# Patient Record
Sex: FEMALE | Race: BLACK OR AFRICAN AMERICAN | NOT HISPANIC OR LATINO
[De-identification: names, ages, dates, MRNs, and addresses within clinical notes are randomized per-mention and may not be internally consistent; named-entity substitution may affect disease eponyms.]

---

## 2017-06-28 ENCOUNTER — TRANSCRIPTION ENCOUNTER (OUTPATIENT)
Age: 32
End: 2017-06-28

## 2017-07-01 ENCOUNTER — EMERGENCY (EMERGENCY)
Facility: HOSPITAL | Age: 32
LOS: 0 days | Discharge: HOME | End: 2017-07-01
Attending: EMERGENCY MEDICINE
Payer: COMMERCIAL

## 2017-07-01 VITALS
RESPIRATION RATE: 19 BRPM | DIASTOLIC BLOOD PRESSURE: 78 MMHG | OXYGEN SATURATION: 94 % | SYSTOLIC BLOOD PRESSURE: 139 MMHG | HEART RATE: 90 BPM | TEMPERATURE: 99 F

## 2017-07-01 VITALS
SYSTOLIC BLOOD PRESSURE: 129 MMHG | OXYGEN SATURATION: 99 % | DIASTOLIC BLOOD PRESSURE: 60 MMHG | TEMPERATURE: 99 F | HEART RATE: 87 BPM | RESPIRATION RATE: 14 BRPM

## 2017-07-01 DIAGNOSIS — R07.9 CHEST PAIN, UNSPECIFIED: ICD-10-CM

## 2017-07-01 DIAGNOSIS — M35.00 SJOGREN SYNDROME, UNSPECIFIED: ICD-10-CM

## 2017-07-01 DIAGNOSIS — I34.1 NONRHEUMATIC MITRAL (VALVE) PROLAPSE: ICD-10-CM

## 2017-07-01 LAB
ALBUMIN SERPL ELPH-MCNC: 3.5 G/DL — SIGNIFICANT CHANGE UP (ref 3.3–5)
ALP SERPL-CCNC: 58 U/L — SIGNIFICANT CHANGE UP (ref 40–120)
ALT FLD-CCNC: 23 U/L — SIGNIFICANT CHANGE UP (ref 12–78)
ANION GAP SERPL CALC-SCNC: 10 MMOL/L — SIGNIFICANT CHANGE UP (ref 5–17)
APTT BLD: 30.6 SEC — SIGNIFICANT CHANGE UP (ref 27.5–37.4)
AST SERPL-CCNC: 8 U/L — LOW (ref 15–37)
BASOPHILS # BLD AUTO: 0.1 K/UL — SIGNIFICANT CHANGE UP (ref 0–0.2)
BASOPHILS NFR BLD AUTO: 1 % — SIGNIFICANT CHANGE UP (ref 0–2)
BILIRUB SERPL-MCNC: 0.4 MG/DL — SIGNIFICANT CHANGE UP (ref 0.2–1.2)
BUN SERPL-MCNC: 14 MG/DL — SIGNIFICANT CHANGE UP (ref 7–23)
CALCIUM SERPL-MCNC: 8.2 MG/DL — LOW (ref 8.5–10.1)
CHLORIDE SERPL-SCNC: 107 MMOL/L — SIGNIFICANT CHANGE UP (ref 96–108)
CK MB BLD-MCNC: <0.4 % — SIGNIFICANT CHANGE UP (ref 0–3.5)
CK MB CFR SERPL CALC: <0.5 NG/ML — SIGNIFICANT CHANGE UP (ref 0.5–3.6)
CK SERPL-CCNC: 116 U/L — SIGNIFICANT CHANGE UP (ref 26–192)
CO2 SERPL-SCNC: 26 MMOL/L — SIGNIFICANT CHANGE UP (ref 22–31)
CREAT SERPL-MCNC: 0.78 MG/DL — SIGNIFICANT CHANGE UP (ref 0.5–1.3)
D DIMER BLD IA.RAPID-MCNC: 303 NG/ML DDU — HIGH
EOSINOPHIL # BLD AUTO: 0.1 K/UL — SIGNIFICANT CHANGE UP (ref 0–0.5)
EOSINOPHIL NFR BLD AUTO: 1.4 % — SIGNIFICANT CHANGE UP (ref 0–6)
GLUCOSE SERPL-MCNC: 77 MG/DL — SIGNIFICANT CHANGE UP (ref 70–99)
HCG SERPL-ACNC: <1 MIU/ML — SIGNIFICANT CHANGE UP
HCT VFR BLD CALC: 37.5 % — SIGNIFICANT CHANGE UP (ref 34.5–45)
HGB BLD-MCNC: 13.8 G/DL — SIGNIFICANT CHANGE UP (ref 11.5–15.5)
INR BLD: 1.06 RATIO — SIGNIFICANT CHANGE UP (ref 0.88–1.16)
LYMPHOCYTES # BLD AUTO: 1.9 K/UL — SIGNIFICANT CHANGE UP (ref 1–3.3)
LYMPHOCYTES # BLD AUTO: 27.9 % — SIGNIFICANT CHANGE UP (ref 13–44)
MCHC RBC-ENTMCNC: 31.6 PG — SIGNIFICANT CHANGE UP (ref 27–34)
MCHC RBC-ENTMCNC: 36.7 GM/DL — HIGH (ref 32–36)
MCV RBC AUTO: 86 FL — SIGNIFICANT CHANGE UP (ref 80–100)
MONOCYTES # BLD AUTO: 0.3 K/UL — SIGNIFICANT CHANGE UP (ref 0–0.9)
MONOCYTES NFR BLD AUTO: 4.7 % — SIGNIFICANT CHANGE UP (ref 2–14)
NEUTROPHILS # BLD AUTO: 4.3 K/UL — SIGNIFICANT CHANGE UP (ref 1.8–7.4)
NEUTROPHILS NFR BLD AUTO: 65 % — SIGNIFICANT CHANGE UP (ref 43–77)
NT-PROBNP SERPL-SCNC: 94 PG/ML — SIGNIFICANT CHANGE UP (ref 0–125)
PLATELET # BLD AUTO: 236 K/UL — SIGNIFICANT CHANGE UP (ref 150–400)
POTASSIUM SERPL-MCNC: 3.6 MMOL/L — SIGNIFICANT CHANGE UP (ref 3.5–5.3)
POTASSIUM SERPL-SCNC: 3.6 MMOL/L — SIGNIFICANT CHANGE UP (ref 3.5–5.3)
PROT SERPL-MCNC: 7.1 GM/DL — SIGNIFICANT CHANGE UP (ref 6–8.3)
PROTHROM AB SERPL-ACNC: 11.6 SEC — SIGNIFICANT CHANGE UP (ref 9.8–12.7)
RBC # BLD: 4.36 M/UL — SIGNIFICANT CHANGE UP (ref 3.8–5.2)
RBC # FLD: 10.8 % — LOW (ref 11–15)
SODIUM SERPL-SCNC: 143 MMOL/L — SIGNIFICANT CHANGE UP (ref 135–145)
TROPONIN I SERPL-MCNC: <.015 NG/ML — SIGNIFICANT CHANGE UP (ref 0.01–0.04)
WBC # BLD: 6.6 K/UL — SIGNIFICANT CHANGE UP (ref 3.8–10.5)
WBC # FLD AUTO: 6.6 K/UL — SIGNIFICANT CHANGE UP (ref 3.8–10.5)

## 2017-07-01 PROCEDURE — 93970 EXTREMITY STUDY: CPT | Mod: 26

## 2017-07-01 PROCEDURE — 71020: CPT | Mod: 26

## 2017-07-01 PROCEDURE — 71275 CT ANGIOGRAPHY CHEST: CPT | Mod: 26

## 2017-07-01 PROCEDURE — 99285 EMERGENCY DEPT VISIT HI MDM: CPT

## 2017-07-01 RX ORDER — HYDROXYCHLOROQUINE SULFATE 200 MG
1 TABLET ORAL
Qty: 0 | Refills: 0 | COMMUNITY

## 2017-07-01 RX ORDER — NORETHINDRONE AND ETHINYL ESTRADIOL 0.4-0.035
0 KIT ORAL
Qty: 0 | Refills: 0 | COMMUNITY

## 2017-07-01 RX ORDER — KETOROLAC TROMETHAMINE 30 MG/ML
30 SYRINGE (ML) INJECTION ONCE
Qty: 0 | Refills: 0 | Status: DISCONTINUED | OUTPATIENT
Start: 2017-07-01 | End: 2017-07-01

## 2017-07-01 RX ADMIN — Medication 30 MILLIGRAM(S): at 19:55

## 2017-07-01 RX ADMIN — Medication 30 MILLIGRAM(S): at 19:44

## 2017-07-01 NOTE — ED PROVIDER NOTE - MEDICAL DECISION MAKING DETAILS
patient presnt with pleuritic chest pain on OCP with background of sjogrens on plaquinil.  mild elevation of d-dimer.  US and CT pending.  Lab values do not require emergent intervention. Patient signed out to incoming physician.  All decisions regarding the progression of care will be made at their discretion. patient presnt with pleuritic chest pain on OCP with background of sjogrens on plaquinil.  mild elevation of d-dimer.  Ultrasound demonstrates no acute pathology. CT scan demonstrates no acute pathology. .  Lab values do not require emergent intervention. Pending repeat labs.  Patient signed out to incoming physician.  All decisions regarding the progression of care will be made at their discretion. patient present with pleuritic chest pain on OCP with background of sjogrens on plaquinil.  mild elevation of d-dimer.  Ultrasound demonstrates no acute pathology. CT scan demonstrates no acute pathology. .  Lab values do not require emergent intervention. discussed r/b/a/ of repeat enzymes.  agree and will support patient in not repeating enzymes.  Patient feeling better.  smiling bedside.  will f/u.  Discussed results and outcome of testing with the patient.  Patient advised to please follow up with their primary care doctor within the next 24 hours and return to the Emergency Department for worsening symptoms or any other concerns.  Patient advised that their doctor may call  to follow up on the specific results of the tests performed today in the emergency department.

## 2017-07-01 NOTE — ED PROVIDER NOTE - OBJECTIVE STATEMENT
Pertinent PMH/PSH/FHx/SHx and Review of Systems contained within:  Patient presents to the ED for left pleurtic chest pain.  PMH of sjogrens and MVP. takes OCP.  Patient with Sx starting 1 week ago.  now with acute worsening today.  also some leg "aching" for 2 days now resolved.  no recent travel.  otherwise baselin.  no other complaints.  As interpreted by ED physician, ECG is NSR with normal intervals/axis, no changes in QRS, no ST/T changes. Non toxic.  Well appearing. No other pertinent PMH.  No other pertinent PSH.  No other pertinent FHx.  Patient denies EtOH/tobacco/illicit substance use. No fever/chills, No photophobia/eye pain/changes in vision, No ear pain/sore throat/dysphagia, No palpitations, no cough/wheeze/stridor, No abdominal pain, No N/V/D, no dysuria/frequency/discharge, No neck/back pain, no rash, no changes in neurological status/function.

## 2017-07-01 NOTE — ED PROVIDER NOTE - PHYSICAL EXAMINATION
Gen: Alert, NAD Head: NC, AT, PERRL, EOMI, normal lids/conjunctiva ENT: normal hearing, patent oropharynx without erythema/exudate, uvula midline Neck: +supple, no tenderness/meningismus/JVD, +Trachea midline Pulm: Bilateral BS, normal resp effort, no wheeze/stridor/retractions CV: RRR, no M/R/G, +dist pulses Abd: soft, NT/ND, +BS, no hepatosplenomegaly Mskel: no edema/erythema/cyanosis Skin: no rash Neuro: AAOx3, no sensory/motor deficits

## 2017-07-01 NOTE — ED ADULT NURSE NOTE - OBJECTIVE STATEMENT
.33 yo female c/o cp x 1 week, worse x 1 hour ago, L side radiating to back, c/o sob. pt reports birth control Minestrin x 3 years denies smoking.  pt drives all day long for work.

## 2017-09-26 ENCOUNTER — APPOINTMENT (OUTPATIENT)
Dept: CARDIOLOGY | Facility: CLINIC | Age: 32
End: 2017-09-26

## 2017-11-06 ENCOUNTER — RESULT REVIEW (OUTPATIENT)
Age: 32
End: 2017-11-06

## 2017-12-10 ENCOUNTER — EMERGENCY (EMERGENCY)
Facility: HOSPITAL | Age: 32
LOS: 1 days | Discharge: HOME | End: 2017-12-10
Admitting: EMERGENCY MEDICINE
Payer: COMMERCIAL

## 2017-12-10 VITALS
SYSTOLIC BLOOD PRESSURE: 119 MMHG | HEART RATE: 93 BPM | TEMPERATURE: 98 F | RESPIRATION RATE: 16 BRPM | OXYGEN SATURATION: 100 % | DIASTOLIC BLOOD PRESSURE: 73 MMHG

## 2017-12-10 DIAGNOSIS — M25.571 PAIN IN RIGHT ANKLE AND JOINTS OF RIGHT FOOT: ICD-10-CM

## 2017-12-10 DIAGNOSIS — Z91.018 ALLERGY TO OTHER FOODS: ICD-10-CM
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
## 2017-12-10 DIAGNOSIS — S93.601A UNSPECIFIED SPRAIN OF RIGHT FOOT, INITIAL ENCOUNTER: ICD-10-CM

## 2017-12-10 DIAGNOSIS — Z79.899 OTHER LONG TERM (CURRENT) DRUG THERAPY: ICD-10-CM

## 2017-12-10 PROCEDURE — 73630 X-RAY EXAM OF FOOT: CPT | Mod: 26,RT

## 2017-12-10 PROCEDURE — 73610 X-RAY EXAM OF ANKLE: CPT | Mod: 26,RT

## 2017-12-10 PROCEDURE — 99284 EMERGENCY DEPT VISIT MOD MDM: CPT | Mod: 25

## 2017-12-10 RX ORDER — TRAMADOL HYDROCHLORIDE 50 MG/1
1 TABLET ORAL
Qty: 12 | Refills: 0 | OUTPATIENT
Start: 2017-12-10

## 2017-12-10 RX ORDER — OXYCODONE AND ACETAMINOPHEN 5; 325 MG/1; MG/1
1 TABLET ORAL ONCE
Qty: 0 | Refills: 0 | Status: DISCONTINUED | OUTPATIENT
Start: 2017-12-10 | End: 2017-12-10

## 2017-12-10 RX ORDER — IBUPROFEN 200 MG
1 TABLET ORAL
Qty: 20 | Refills: 0 | OUTPATIENT
Start: 2017-12-10

## 2017-12-10 RX ADMIN — OXYCODONE AND ACETAMINOPHEN 1 TABLET(S): 5; 325 TABLET ORAL at 21:05

## 2017-12-10 RX ADMIN — OXYCODONE AND ACETAMINOPHEN 1 TABLET(S): 5; 325 TABLET ORAL at 20:32

## 2017-12-10 NOTE — ED PROVIDER NOTE - MEDICAL DECISION MAKING DETAILS
Ar compression dressing applied by me, encouraged RICE to affected region, weight bear as tolerated, f/u ortho, pt verbalized understanding. xray neg for acute fx, CAM boot and crutches provided, encouraged RICE to affected region, weight bear as tolerated, f/u ortho, pt verbalized understanding.

## 2017-12-10 NOTE — ED PROVIDER NOTE - PHYSICAL EXAMINATION
Gen - WDWN, NAD, comfortable in stretcher,  non-toxic appearing  Skin - warm, dry, intact   CV - S1S2, R/R/R  Resp - CTAB, no r/r/w   MS - w/w/p, no midline tenderness, neck supple and NT, R foot with +edema and TTP over 5th MTP region, no erythema, ecchymosis, crepitus, joint laxity, or deformity, restricted ROM 2/2 pain, NV intact.   Neuro - AxOx3, ambulatory with mild limp

## 2017-12-10 NOTE — ED PROVIDER NOTE - OBJECTIVE STATEMENT
33 yo F with PMHx of sjogren' s syndrome, presenting c/o R ankle and foot pain s/p fall.  Pt slipped and fell today and landed on R foot.  Now with pain and difficulty with weight bearing.  Denies head trauma, LOC, break in the skin, paresthesia, numbness, tingling, redness, bleeding, d/c, HA, dizziness, SOB, CP, palpitations, N/V, focal weakness, and malaise. Pt is unable to weight bear on the R s/p fall 33 yo F with PMHx of sjogren' s syndrome and MVP, presenting c/o R ankle and foot pain s/p fall.  Pt slipped and fell today and landed on R foot.  Now with pain and difficulty with weight bearing.  Denies head trauma, LOC, break in the skin, paresthesia, numbness, tingling, redness, bleeding, d/c, HA, dizziness, SOB, CP, palpitations, N/V, focal weakness, and malaise. Pt is unable to weight bear on the R s/p fall

## 2018-02-05 ENCOUNTER — TRANSCRIPTION ENCOUNTER (OUTPATIENT)
Age: 33
End: 2018-02-05

## 2018-12-17 ENCOUNTER — RESULT REVIEW (OUTPATIENT)
Age: 33
End: 2018-12-17

## 2019-05-22 ENCOUNTER — TRANSCRIPTION ENCOUNTER (OUTPATIENT)
Age: 34
End: 2019-05-22

## 2019-06-28 NOTE — ED PROVIDER NOTE - DIAGNOSTIC INTERPRETATION
Oriented - self; Oriented - place; Oriented - time
Xray (wet reads) interpreted by JONN ARRIETA   Xray foot/ankle - +soft tissue swelling. no acute fx or dislocation, joint space intact, no effusion noted

## 2019-12-23 ENCOUNTER — RESULT REVIEW (OUTPATIENT)
Age: 34
End: 2019-12-23

## 2021-02-22 ENCOUNTER — RESULT REVIEW (OUTPATIENT)
Age: 36
End: 2021-02-22

## 2022-04-28 PROBLEM — I34.1 NONRHEUMATIC MITRAL (VALVE) PROLAPSE: Chronic | Status: ACTIVE | Noted: 2017-07-01

## 2022-04-28 PROBLEM — M35.00 SJOGREN SYNDROME, UNSPECIFIED: Chronic | Status: ACTIVE | Noted: 2017-12-10

## 2022-05-31 ENCOUNTER — RESULT REVIEW (OUTPATIENT)
Age: 37
End: 2022-05-31

## 2022-05-31 ENCOUNTER — APPOINTMENT (OUTPATIENT)
Dept: OBGYN | Facility: CLINIC | Age: 37
End: 2022-05-31
Payer: COMMERCIAL

## 2022-05-31 PROCEDURE — 76830 TRANSVAGINAL US NON-OB: CPT

## 2022-05-31 PROCEDURE — 81002 URINALYSIS NONAUTO W/O SCOPE: CPT

## 2022-05-31 PROCEDURE — 99395 PREV VISIT EST AGE 18-39: CPT | Mod: 25

## 2022-08-16 ENCOUNTER — APPOINTMENT (OUTPATIENT)
Dept: ANESTHESIOLOGY | Facility: CLINIC | Age: 37
End: 2022-08-16

## 2022-08-16 ENCOUNTER — OUTPATIENT (OUTPATIENT)
Dept: OUTPATIENT SERVICES | Facility: HOSPITAL | Age: 37
LOS: 1 days | End: 2022-08-16
Payer: COMMERCIAL

## 2022-08-16 DIAGNOSIS — G58.0 INTERCOSTAL NEUROPATHY: ICD-10-CM

## 2022-08-16 PROCEDURE — 64421 NJX AA&/STRD NTRCOST NRV EA: CPT

## 2022-09-12 ENCOUNTER — APPOINTMENT (OUTPATIENT)
Dept: MRI IMAGING | Facility: CLINIC | Age: 37
End: 2022-09-12

## 2022-12-27 ENCOUNTER — OUTPATIENT (OUTPATIENT)
Dept: OUTPATIENT SERVICES | Facility: HOSPITAL | Age: 37
LOS: 1 days | End: 2022-12-27
Payer: COMMERCIAL

## 2022-12-27 ENCOUNTER — APPOINTMENT (OUTPATIENT)
Dept: ANESTHESIOLOGY | Facility: CLINIC | Age: 37
End: 2022-12-27

## 2022-12-27 DIAGNOSIS — G80.0 SPASTIC QUADRIPLEGIC CEREBRAL PALSY: ICD-10-CM

## 2022-12-27 DIAGNOSIS — G58.0 INTERCOSTAL NEUROPATHY: ICD-10-CM

## 2022-12-27 PROCEDURE — 64420 NJX AA&/STRD NTRCOST NRV 1: CPT

## 2023-04-28 ENCOUNTER — APPOINTMENT (OUTPATIENT)
Dept: MRI IMAGING | Facility: CLINIC | Age: 38
End: 2023-04-28

## 2023-05-04 ENCOUNTER — APPOINTMENT (OUTPATIENT)
Dept: MRI IMAGING | Facility: CLINIC | Age: 38
End: 2023-05-04

## 2023-05-16 ENCOUNTER — NON-APPOINTMENT (OUTPATIENT)
Age: 38
End: 2023-05-16

## 2023-05-16 ENCOUNTER — APPOINTMENT (OUTPATIENT)
Dept: OPHTHALMOLOGY | Facility: CLINIC | Age: 38
End: 2023-05-16
Payer: COMMERCIAL

## 2023-05-16 PROCEDURE — 92134 CPTRZ OPH DX IMG PST SGM RTA: CPT

## 2023-05-16 PROCEDURE — 92004 COMPRE OPH EXAM NEW PT 1/>: CPT

## 2023-06-19 ENCOUNTER — APPOINTMENT (OUTPATIENT)
Dept: OBGYN | Facility: CLINIC | Age: 38
End: 2023-06-19
Payer: COMMERCIAL

## 2023-06-19 PROCEDURE — 76830 TRANSVAGINAL US NON-OB: CPT

## 2023-06-19 PROCEDURE — 81002 URINALYSIS NONAUTO W/O SCOPE: CPT

## 2023-06-19 PROCEDURE — 99395 PREV VISIT EST AGE 18-39: CPT

## 2023-07-06 ENCOUNTER — APPOINTMENT (OUTPATIENT)
Dept: MRI IMAGING | Facility: CLINIC | Age: 38
End: 2023-07-06

## 2023-09-01 ENCOUNTER — NON-APPOINTMENT (OUTPATIENT)
Age: 38
End: 2023-09-01

## 2023-10-20 ENCOUNTER — NON-APPOINTMENT (OUTPATIENT)
Age: 38
End: 2023-10-20

## 2024-01-29 ENCOUNTER — APPOINTMENT (OUTPATIENT)
Dept: OBGYN | Facility: CLINIC | Age: 39
End: 2024-01-29
Payer: COMMERCIAL

## 2024-01-29 PROCEDURE — 76830 TRANSVAGINAL US NON-OB: CPT

## 2024-01-29 PROCEDURE — 99213 OFFICE O/P EST LOW 20 MIN: CPT

## 2024-02-18 ENCOUNTER — NON-APPOINTMENT (OUTPATIENT)
Age: 39
End: 2024-02-18

## 2024-02-21 ENCOUNTER — NON-APPOINTMENT (OUTPATIENT)
Age: 39
End: 2024-02-21

## 2024-06-04 ENCOUNTER — APPOINTMENT (OUTPATIENT)
Dept: DERMATOLOGY | Facility: CLINIC | Age: 39
End: 2024-06-04

## 2024-06-11 ENCOUNTER — APPOINTMENT (OUTPATIENT)
Dept: ORTHOPEDIC SURGERY | Facility: CLINIC | Age: 39
End: 2024-06-11
Payer: COMMERCIAL

## 2024-06-11 VITALS — HEIGHT: 70 IN | BODY MASS INDEX: 27.92 KG/M2 | WEIGHT: 195 LBS

## 2024-06-11 DIAGNOSIS — M76.72 PERONEAL TENDINITIS, LEFT LEG: ICD-10-CM

## 2024-06-11 DIAGNOSIS — M76.822 POSTERIOR TIBIAL TENDINITIS, LEFT LEG: ICD-10-CM

## 2024-06-11 PROCEDURE — 73600 X-RAY EXAM OF ANKLE: CPT | Mod: LT

## 2024-06-11 PROCEDURE — 99203 OFFICE O/P NEW LOW 30 MIN: CPT

## 2024-06-11 PROCEDURE — L4361: CPT | Mod: LT

## 2024-06-11 PROCEDURE — 73630 X-RAY EXAM OF FOOT: CPT | Mod: LT

## 2024-06-11 RX ORDER — METHYLPREDNISOLONE 4 MG/1
4 TABLET ORAL
Qty: 1 | Refills: 0 | Status: ACTIVE | COMMUNITY
Start: 2024-06-11 | End: 1900-01-01

## 2024-06-11 NOTE — PHYSICAL EXAM
[Moderate] : moderate diffused ankle swelling [NL (40)] : plantar flexion 40 degrees [NL 30)] : inversion 30 degrees [NL (20)] : eversion 20 degrees [5___] : eversion 5[unfilled]/5 [2+] : posterior tibialis pulse: 2+ [Left] : left foot [There are no fractures, subluxations or dislocations. No significant abnormalities are seen] : There are no fractures, subluxations or dislocations. No significant abnormalities are seen [] : not able to perform single heel raise

## 2024-06-11 NOTE — ASSESSMENT
[FreeTextEntry1] : Xrays reviewed with patient Treatment options disussed ASO brace applied today -  medically necessary for stability / protected weight bearing MDP sent for pain and inflammation - patient on daily indomethacin for sjorgen's  Recommend course of PT/HEP Supportive sneaker / activity modification  Discussed ice and elevation Follow up in 3 weeks

## 2024-06-11 NOTE — HISTORY OF PRESENT ILLNESS
[7] : 7 [0] : 0 [Dull/Aching] : dull/aching [Localized] : localized [Intermittent] : intermittent [Standing] : standing [Walking] : walking [Stairs] : stairs [Full time] : Work status: full time [de-identified] : 06/11/2024: Patient is a 40 yo female c/o left foot/ankle pain for 1 week, worsening 3 days ago, with no specific injury. She has been increasing her activity / working out. No n/t. Taking advil as needed. No previous injuries or surgeries to left foot/ankle.  [] : Post Surgical Visit: no [FreeTextEntry1] : left foot [FreeTextEntry5] : Patient woke up on 6/8/24 with pain in her left foot/ankle, no specific injury, pt states she has been doing a lot of walking. no prior hx

## 2024-07-03 ENCOUNTER — APPOINTMENT (OUTPATIENT)
Dept: ORTHOPEDIC SURGERY | Facility: CLINIC | Age: 39
End: 2024-07-03

## 2024-07-22 ENCOUNTER — APPOINTMENT (OUTPATIENT)
Dept: OBGYN | Facility: CLINIC | Age: 39
End: 2024-07-22

## 2024-07-22 PROCEDURE — 99459 PELVIC EXAMINATION: CPT

## 2024-07-22 PROCEDURE — 76830 TRANSVAGINAL US NON-OB: CPT

## 2024-07-22 PROCEDURE — 99396 PREV VISIT EST AGE 40-64: CPT | Mod: 25

## 2024-07-22 PROCEDURE — 81002 URINALYSIS NONAUTO W/O SCOPE: CPT

## 2024-08-20 ENCOUNTER — APPOINTMENT (OUTPATIENT)
Dept: OPHTHALMOLOGY | Facility: CLINIC | Age: 39
End: 2024-08-20
Payer: COMMERCIAL

## 2024-08-20 ENCOUNTER — NON-APPOINTMENT (OUTPATIENT)
Age: 39
End: 2024-08-20

## 2024-08-20 PROCEDURE — 92134 CPTRZ OPH DX IMG PST SGM RTA: CPT

## 2024-08-20 PROCEDURE — 92083 EXTENDED VISUAL FIELD XM: CPT

## 2024-08-20 PROCEDURE — 92004 COMPRE OPH EXAM NEW PT 1/>: CPT

## 2024-09-17 ENCOUNTER — APPOINTMENT (OUTPATIENT)
Dept: DERMATOLOGY | Facility: CLINIC | Age: 39
End: 2024-09-17
Payer: COMMERCIAL

## 2024-09-17 DIAGNOSIS — L65.9 NONSCARRING HAIR LOSS, UNSPECIFIED: ICD-10-CM

## 2024-09-17 DIAGNOSIS — L65.8 OTHER SPECIFIED NONSCARRING HAIR LOSS: ICD-10-CM

## 2024-09-17 DIAGNOSIS — D23.9 OTHER BENIGN NEOPLASM OF SKIN, UNSPECIFIED: ICD-10-CM

## 2024-09-17 PROCEDURE — 99204 OFFICE O/P NEW MOD 45 MIN: CPT | Mod: 25

## 2024-09-17 PROCEDURE — 11900 INJECT SKIN LESIONS </W 7: CPT

## 2024-09-17 NOTE — ASSESSMENT
[FreeTextEntry1] : #Syringomas, multiple periocular  - Discussed no great topical medication options as these are growths - Discussed possible treatment with cautery for cosmetic treatment, would do test spot today if desired, discussed risk of PIH, pt declines  #Alopecia, favor long standing traction alopecia with some areas of scarring though patient feels she has not been wearing tight hairstyle over the last few years and it has still gotten worse  Chronic, progressive  - Photos taken for clinical monitoring 09/17/2024  - Discussed could consider bx, but would not definitely give a diagnosis  -Reviewed importance of wearing hair in loose fitting styles -Discussed treatment options including ILK and PO minoxidil vs topical to only these sites  -- would need to discuss PO minoxidil with cardiologist given MVP - defers today  - ILK Session #1 09/17/2024 -will plan for trial of 4 sessions  Procedure note: Intralesional injection of triamcinolone (ILTAC)  Site: b/l temples  2 lesions injected with intralesional triamcinolone  2.5 mg/cc  2 ccs injected total  Verbal informed consent obtained. Risks/benefits/alternatives discussed including but not limited to risk of bleeding, hypopigmentation, striae and atrophy as well as possible lack of treatment efficacy and need for repeat treatments. Site confirmed. Area prepped with alcohol. Discussed wound care instructions. Patient tolerated well with no immediate complications.  RTC 4-6 weeks

## 2024-09-17 NOTE — HISTORY OF PRESENT ILLNESS
[FreeTextEntry1] : NPV: hair loss [de-identified] : REY GILLIS is a 39 year old w/ Sjogren's on Plaquenil & MVP who is presenting for evaluation of:   1. Bumps around the eyes, thinks they were milia, had them lanced as child, has been developing more over the yrs  2. 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

## 2024-09-17 NOTE — PHYSICAL EXAM
[FreeTextEntry3] : Focused skin exam performed  The relevant portions of the exam were performed today  AAOx3, NAD, well-appearing / pleasant Focused examination within normal limits with the exception of:  - skin colored papules in the periocular area  - loss of follicles in the b/l temples  - miniaturization of hair follicles on the b/l temples  + fringe sign

## 2024-10-29 ENCOUNTER — APPOINTMENT (OUTPATIENT)
Dept: DERMATOLOGY | Facility: CLINIC | Age: 39
End: 2024-10-29

## 2024-12-12 ENCOUNTER — EMERGENCY (EMERGENCY)
Facility: HOSPITAL | Age: 39
LOS: 1 days | Discharge: HOME | End: 2024-12-12
Attending: EMERGENCY MEDICINE
Payer: COMMERCIAL

## 2024-12-12 VITALS
SYSTOLIC BLOOD PRESSURE: 100 MMHG | DIASTOLIC BLOOD PRESSURE: 78 MMHG | HEART RATE: 98 BPM | OXYGEN SATURATION: 98 % | RESPIRATION RATE: 20 BRPM

## 2024-12-12 VITALS
DIASTOLIC BLOOD PRESSURE: 80 MMHG | TEMPERATURE: 98 F | WEIGHT: 190.04 LBS | HEIGHT: 69 IN | RESPIRATION RATE: 20 BRPM | HEART RATE: 116 BPM | SYSTOLIC BLOOD PRESSURE: 133 MMHG | OXYGEN SATURATION: 98 %

## 2024-12-12 PROCEDURE — 96374 THER/PROPH/DIAG INJ IV PUSH: CPT

## 2024-12-12 PROCEDURE — 99284 EMERGENCY DEPT VISIT MOD MDM: CPT | Mod: 25

## 2024-12-12 PROCEDURE — 96375 TX/PRO/DX INJ NEW DRUG ADDON: CPT

## 2024-12-12 PROCEDURE — 99284 EMERGENCY DEPT VISIT MOD MDM: CPT

## 2024-12-12 RX ORDER — FAMOTIDINE 20 MG/1
20 TABLET, FILM COATED ORAL ONCE
Refills: 0 | Status: COMPLETED | OUTPATIENT
Start: 2024-12-12 | End: 2024-12-12

## 2024-12-12 RX ORDER — SODIUM CHLORIDE 9 MG/ML
1000 INJECTION, SOLUTION INTRAMUSCULAR; INTRAVENOUS; SUBCUTANEOUS ONCE
Refills: 0 | Status: COMPLETED | OUTPATIENT
Start: 2024-12-12 | End: 2024-12-12

## 2024-12-12 RX ORDER — DIPHENHYDRAMINE HCL 25 MG
25 CAPSULE ORAL ONCE
Refills: 0 | Status: COMPLETED | OUTPATIENT
Start: 2024-12-12 | End: 2024-12-12

## 2024-12-12 RX ORDER — METHYLPREDNISOLONE SOD SUCC 125 MG
125 VIAL (EA) INJECTION ONCE
Refills: 0 | Status: COMPLETED | OUTPATIENT
Start: 2024-12-12 | End: 2024-12-12

## 2024-12-12 RX ORDER — FAMOTIDINE 20 MG/1
20 TABLET, FILM COATED ORAL DAILY
Refills: 0 | Status: DISCONTINUED | OUTPATIENT
Start: 2024-12-12 | End: 2024-12-12

## 2024-12-12 RX ORDER — EPINEPHRINE 1 MG/ML(1)
0.3 AMPUL (ML) INJECTION
Qty: 1 | Refills: 0
Start: 2024-12-12 | End: 2024-12-12

## 2024-12-12 RX ORDER — PREDNISONE 20 MG/1
1 TABLET ORAL
Qty: 4 | Refills: 0
Start: 2024-12-12 | End: 2024-12-15

## 2024-12-12 RX ADMIN — Medication 125 MILLIGRAM(S): at 16:57

## 2024-12-12 RX ADMIN — SODIUM CHLORIDE 1000 MILLILITER(S): 9 INJECTION, SOLUTION INTRAMUSCULAR; INTRAVENOUS; SUBCUTANEOUS at 16:57

## 2024-12-12 RX ADMIN — Medication 25 MILLIGRAM(S): at 16:57

## 2024-12-12 RX ADMIN — FAMOTIDINE 20 MILLIGRAM(S): 20 TABLET, FILM COATED ORAL at 17:02

## 2024-12-12 NOTE — ED ADULT TRIAGE NOTE - CHIEF COMPLAINT QUOTE
BIOBA for  hives  and  redness  all over  the body  asscidetally  ate  a  sesame  seed  oil  from her  salad.  no signs of  any  shortness of  breath  . pt given  benadryl PO  on the  scene.

## 2024-12-12 NOTE — ED ADULT NURSE NOTE - NS ED NURSE DC INFO COMPLEXITY
Skin normal color for race, warm, dry and intact. No evidence of rash. Simple: Patient demonstrates quick and easy understanding/Returned Demonstration/Verbalized Understanding

## 2024-12-12 NOTE — ED PROVIDER NOTE - PROGRESS NOTE DETAILS
Lizzy Obrien DO Patient received as a signout.  Patient reports her symptoms are completely resolved at this time - had two systems involved.  Patient did get EpiPen.  Given the extension of her symptoms at this time during the episode patient is given instructions on how to use EpiPen if necessary.  Will send it to her pharmacy.  Strict return precautions are discussed all questions answered.

## 2024-12-12 NOTE — ED PROVIDER NOTE - CLINICAL SUMMARY MEDICAL DECISION MAKING FREE TEXT BOX
will treat for allergic reaction to sesame oil.pt counseled/epipen given as out pt.fu allergy testing

## 2024-12-12 NOTE — ED PROVIDER NOTE - OBJECTIVE STATEMENT
pt biba secondary to allergic reaction,post seasme oil ingestion 1 hr prior.no lip/tongue swelling/no wheeze.nauseated/vomiting

## 2024-12-12 NOTE — ED PROVIDER NOTE - NSFOLLOWUPINSTRUCTIONS_ED_ALL_ED_FT
Increase Avapro to 300mg daily  Decrease lasix to 20 mg daily   Anaphylactic Reaction, Adult  An anaphylactic reaction is a sudden and very bad allergic reaction. It must be treated right away.    What are the causes?  Being exposed to things you are allergic to (allergens). These may be:  Foods, like peanuts, wheat, shellfish, milk, or eggs.  Medicines.  Insect bites or stings.  Blood or parts of blood that have been given to you for treatment (transfusions).  Chemicals. These include latex and dyes used in food and medical tests.  What increases the risk?  Having allergies.  Having had this kind of reaction before.  Having a family history of this kind of reaction.  Having asthma or eczema.  What are the signs or symptoms?  Feeling warm in the face (flushed). Your face may turn red.  Hives. These are itchy, red, swollen areas of skin.  Swelling of the eyes, lips, face, mouth, tongue, or throat.  Trouble breathing or speaking.  Trouble swallowing.  Feeling dizzy or fainting.  Pain or cramps in your belly (abdomen).  Vomiting or watery poop (diarrhea).  How is this treated?  A person using an auto-injector pen in the thigh.  If you are having a bad allergic reaction, you should:  Give yourself a shot using a device filled with epinephrine (auto-injector pen). Your doctor will teach you how to use the pen.  Call for help. If you use an auto-injector pen, you must still get treated in the hospital. You may be given:  Medicines.  Oxygen.  Fluids in an IV tube.  Follow these instructions at home:  Safety    Always keep an auto-injector pen with you. If you can, carry two pens. Use the pen as told by your doctor. After you use the pen, get more medicine for it right away.  Do not drive after you have a reaction. Wait until your doctor says it is safe to drive.  Make sure that you, the people who live with you, and your employer know:  What you are allergic to.  How to use your auto-injector pen.  If told by your doctor, wear a bracelet or necklace that says you have an allergy.  Learn the signs of a very bad allergic reaction.  Work with your doctors to make a plan for what to do if you have a very bad reaction.  General instructions    Take over-the-counter and prescription medicines only as told by your doctor.  If you have a rash or itchy skin:  Use an allergy medicine as told by your doctor.  Put cold, wet cloths on your skin.  Take a cool bath or shower. Do not use hot water.  Tell all of your doctors that you have an allergy.  How is this prevented?  Avoid things that have given you a bad reaction before.  Tell your  about your allergy when you go out to eat. If you are not sure if your meal contains food that you are allergic to, ask your  before you eat it.  Where to find more information  American Academy of Allergy, Asthma, and Immunology (AAAAI): aaaai.org  Contact a doctor if:  Your auto-injector pen is .  Get help right away if:  You have a bad allergic reaction.  You use your auto-injector pen. You must go to the hospital even if the medicine seems to be working.  These symptoms may be an emergency. Use the auto-injector pen right away. Then call 911.  Do not wait to see if the symptoms will go away.  Do not drive yourself to the hospital.

## 2025-01-02 ENCOUNTER — NON-APPOINTMENT (OUTPATIENT)
Age: 40
End: 2025-01-02

## 2025-01-10 ENCOUNTER — NON-APPOINTMENT (OUTPATIENT)
Age: 40
End: 2025-01-10

## 2025-01-13 ENCOUNTER — LABORATORY RESULT (OUTPATIENT)
Age: 40
End: 2025-01-13

## 2025-01-13 ENCOUNTER — NON-APPOINTMENT (OUTPATIENT)
Age: 40
End: 2025-01-13

## 2025-01-13 ENCOUNTER — APPOINTMENT (OUTPATIENT)
Dept: OBGYN | Facility: CLINIC | Age: 40
End: 2025-01-13
Payer: COMMERCIAL

## 2025-01-13 ENCOUNTER — APPOINTMENT (OUTPATIENT)
Dept: ALLERGY | Facility: CLINIC | Age: 40
End: 2025-01-13
Payer: COMMERCIAL

## 2025-01-13 VITALS
BODY MASS INDEX: 27.92 KG/M2 | SYSTOLIC BLOOD PRESSURE: 120 MMHG | WEIGHT: 195 LBS | DIASTOLIC BLOOD PRESSURE: 70 MMHG | HEIGHT: 70 IN | TEMPERATURE: 97.9 F | HEART RATE: 79 BPM | OXYGEN SATURATION: 99 %

## 2025-01-13 DIAGNOSIS — Z91.018 ALLERGY TO OTHER FOODS: ICD-10-CM

## 2025-01-13 PROCEDURE — 95004 PERQ TESTS W/ALRGNC XTRCS: CPT

## 2025-01-13 PROCEDURE — 99213 OFFICE O/P EST LOW 20 MIN: CPT

## 2025-01-13 PROCEDURE — 99204 OFFICE O/P NEW MOD 45 MIN: CPT | Mod: 25

## 2025-01-13 PROCEDURE — 76830 TRANSVAGINAL US NON-OB: CPT

## 2025-01-16 ENCOUNTER — NON-APPOINTMENT (OUTPATIENT)
Age: 40
End: 2025-01-16

## 2025-01-16 LAB
ALMOND IGE QN: 0.27 KUA/L
BRAZIL NUT IGE QN: 2.34 KUA/L
CASHEW NUT IGE QN: 1.42 KUA/L
DEPRECATED ALMOND IGE RAST QL: NORMAL
DEPRECATED BRAZIL NUT IGE RAST QL: 2
DEPRECATED CASHEW NUT IGE RAST QL: 2
DEPRECATED HAZELNUT IGE RAST QL: 2
DEPRECATED KIWIFRUIT IGE RAST QL: 0.3 KUA/L
DEPRECATED MACADAMIA IGE RAST QL: 2
DEPRECATED PEANUT IGE RAST QL: NORMAL
DEPRECATED PECAN/HICK TREE IGE RAST QL: 0
DEPRECATED PINE NUT IGE RAST QL: 0
DEPRECATED PISTACHIO IGE RAST QL: 0.95 KUA/L
DEPRECATED POPPY SEED IGE RAST QL: 2
DEPRECATED SESAME SEED IGE RAST QL: 3
DEPRECATED STRAWBERRY IGE RAST QL: 0
DEPRECATED WALNUT IGE RAST QL: 0
DEPRECATED WATERMELON IGE RAST QL: 0
HAZELNUT IGE QN: 1.49 KUA/L
KIWIFRUIT IGE QN: NORMAL
MACADAMIA IGE QN: 2.48 KUA/L
PEANUT (RARA H) 1 IGE QN: <0.1 KUA/L
PEANUT (RARA H) 2 IGE QN: <0.1 KUA/L
PEANUT (RARA H) 3 IGE QN: <0.1 KUA/L
PEANUT (RARA H) 6 IGE QN: <0.1 KUA/L
PEANUT (RARA H) 8 IGE QN: 0.26 KUA/L
PEANUT (RARA H) 9 IGE QN: <0.1 KUA/L
PEANUT IGE QN: 0.34 KUA/L
PECAN/HICK TREE IGE QN: <0.1 KUA/L
PINE NUT IGE QN: <0.1 KUA/L
PISTACHIO IGE QN: 2
POPPY SEED IGE QN: 2.21 KUA/L
R COR A1 PR-10 HAZELNUT (F428) CLASS: 1
R COR A1 PR-10 HAZELNUT (F428) CONC: 0.45 KUA/L
R COR A14 HAZELNUT (F439) CLASS: 0
R COR A14 HAZELNUT (F439) CONC: <0.1 KUA/L
R COR A8 LTP HAZELNUT (F425) CLASS: 0
R COR A8 LTP HAZELNUT (F425) CONC: <0.1 KUA/L
R COR A9 HAZELNUT (F440) CLASS: 2
R COR A9 HAZELNUT (F440) CONC: 1.84 KUA/L
R JUG R1 STORAGE PROTEIN WALNUT (F441) CLASS: 0
R JUG R1 STORAGE PROTEIN WALNUT (F441) CONC: <0.1 KUA/L
R JUG R3 LPT WALNUT (F442) CLASS: 0
R JUG R3 LPT WALNUT (F442) CONC: <0.1 KUA/L
RARA H 6 STORAGE PROTEIN (F447) CLASS: 0
RARA H1 STORAGE PROTEIN (F422) CLASS: 0
RARA H2 STORAGE PROTEIN (F423) CLASS: 0
RARA H3 STORAGE PROTEIN (F424) CLASS: 0
RARA H8 PR-10 PROTEIN (F352) CLASS: ABNORMAL
RARA H9 LIPID TRANSFERTP (F427) CLASS: 0
SESAME SEED IGE QN: 5.16 KUA/L
STRAWBERRY IGE QN: <0.1 KUA/L
WALNUT IGE QN: <0.1 KUA/L
WATERMELON IGE QN: <0.1 KUA/L

## 2025-01-29 ENCOUNTER — APPOINTMENT (OUTPATIENT)
Dept: ALLERGY | Facility: CLINIC | Age: 40
End: 2025-01-29
Payer: COMMERCIAL

## 2025-01-29 PROCEDURE — 99213 OFFICE O/P EST LOW 20 MIN: CPT | Mod: 25

## 2025-01-29 PROCEDURE — 95004 PERQ TESTS W/ALRGNC XTRCS: CPT

## 2025-01-30 ENCOUNTER — NON-APPOINTMENT (OUTPATIENT)
Age: 40
End: 2025-01-30

## 2025-02-25 ENCOUNTER — APPOINTMENT (OUTPATIENT)
Dept: CARDIOLOGY | Facility: CLINIC | Age: 40
End: 2025-02-25
Payer: COMMERCIAL

## 2025-02-25 VITALS
WEIGHT: 195 LBS | HEART RATE: 88 BPM | BODY MASS INDEX: 27.98 KG/M2 | OXYGEN SATURATION: 98 % | DIASTOLIC BLOOD PRESSURE: 70 MMHG | SYSTOLIC BLOOD PRESSURE: 120 MMHG

## 2025-02-25 DIAGNOSIS — R53.83 OTHER FATIGUE: ICD-10-CM

## 2025-02-25 DIAGNOSIS — Z00.00 ENCOUNTER FOR GENERAL ADULT MEDICAL EXAMINATION W/OUT ABNORMAL FINDINGS: ICD-10-CM

## 2025-02-25 DIAGNOSIS — M25.50 PAIN IN UNSPECIFIED JOINT: ICD-10-CM

## 2025-02-25 DIAGNOSIS — R01.1 CARDIAC MURMUR, UNSPECIFIED: ICD-10-CM

## 2025-02-25 PROCEDURE — 99402 PREV MED CNSL INDIV APPRX 30: CPT

## 2025-02-25 PROCEDURE — 93000 ELECTROCARDIOGRAM COMPLETE: CPT

## 2025-02-25 PROCEDURE — 99204 OFFICE O/P NEW MOD 45 MIN: CPT | Mod: 25

## 2025-02-25 PROCEDURE — G0537: CPT

## 2025-03-04 ENCOUNTER — APPOINTMENT (OUTPATIENT)
Dept: CARDIOLOGY | Facility: CLINIC | Age: 40
End: 2025-03-04
Payer: COMMERCIAL

## 2025-03-04 PROCEDURE — 93306 TTE W/DOPPLER COMPLETE: CPT

## 2025-03-12 ENCOUNTER — APPOINTMENT (OUTPATIENT)
Dept: CARDIOLOGY | Facility: CLINIC | Age: 40
End: 2025-03-12

## 2025-05-05 ENCOUNTER — APPOINTMENT (OUTPATIENT)
Dept: CARDIOLOGY | Facility: CLINIC | Age: 40
End: 2025-05-05

## 2025-05-05 ENCOUNTER — NON-APPOINTMENT (OUTPATIENT)
Age: 40
End: 2025-05-05

## 2025-05-05 VITALS
SYSTOLIC BLOOD PRESSURE: 122 MMHG | BODY MASS INDEX: 27.98 KG/M2 | WEIGHT: 195 LBS | DIASTOLIC BLOOD PRESSURE: 78 MMHG | HEART RATE: 89 BPM

## 2025-05-05 DIAGNOSIS — Z82.41 FAMILY HISTORY OF SUDDEN CARDIAC DEATH: ICD-10-CM

## 2025-05-05 PROCEDURE — 93000 ELECTROCARDIOGRAM COMPLETE: CPT

## 2025-05-05 PROCEDURE — 99215 OFFICE O/P EST HI 40 MIN: CPT

## 2025-05-05 PROCEDURE — 99417 PROLNG OP E/M EACH 15 MIN: CPT

## 2025-05-09 ENCOUNTER — NON-APPOINTMENT (OUTPATIENT)
Age: 40
End: 2025-05-09

## 2025-05-13 ENCOUNTER — NON-APPOINTMENT (OUTPATIENT)
Age: 40
End: 2025-05-13

## 2025-05-15 ENCOUNTER — NON-APPOINTMENT (OUTPATIENT)
Age: 40
End: 2025-05-15

## 2025-07-23 ENCOUNTER — APPOINTMENT (OUTPATIENT)
Dept: CARDIOLOGY | Facility: CLINIC | Age: 40
End: 2025-07-23
Payer: COMMERCIAL

## 2025-07-23 PROCEDURE — 99215 OFFICE O/P EST HI 40 MIN: CPT | Mod: 95

## 2025-08-11 ENCOUNTER — APPOINTMENT (OUTPATIENT)
Dept: CARDIOLOGY | Facility: CLINIC | Age: 40
End: 2025-08-11
Payer: COMMERCIAL

## 2025-08-11 ENCOUNTER — APPOINTMENT (OUTPATIENT)
Dept: OBGYN | Facility: CLINIC | Age: 40
End: 2025-08-11
Payer: COMMERCIAL

## 2025-08-11 ENCOUNTER — NON-APPOINTMENT (OUTPATIENT)
Age: 40
End: 2025-08-11

## 2025-08-11 VITALS
SYSTOLIC BLOOD PRESSURE: 110 MMHG | DIASTOLIC BLOOD PRESSURE: 80 MMHG | WEIGHT: 210 LBS | HEART RATE: 86 BPM | OXYGEN SATURATION: 97 % | BODY MASS INDEX: 30.13 KG/M2

## 2025-08-11 DIAGNOSIS — R01.1 CARDIAC MURMUR, UNSPECIFIED: ICD-10-CM

## 2025-08-11 DIAGNOSIS — R07.9 CHEST PAIN, UNSPECIFIED: ICD-10-CM

## 2025-08-11 PROCEDURE — 93000 ELECTROCARDIOGRAM COMPLETE: CPT

## 2025-08-11 PROCEDURE — 99459 PELVIC EXAMINATION: CPT

## 2025-08-11 PROCEDURE — 99214 OFFICE O/P EST MOD 30 MIN: CPT

## 2025-08-11 PROCEDURE — 81002 URINALYSIS NONAUTO W/O SCOPE: CPT

## 2025-08-11 PROCEDURE — 99396 PREV VISIT EST AGE 40-64: CPT

## 2025-08-11 PROCEDURE — G2211 COMPLEX E/M VISIT ADD ON: CPT

## 2025-08-19 ENCOUNTER — APPOINTMENT (OUTPATIENT)
Dept: CARDIOLOGY | Facility: CLINIC | Age: 40
End: 2025-08-19
Payer: COMMERCIAL

## 2025-08-19 DIAGNOSIS — E66.9 OBESITY, UNSPECIFIED: ICD-10-CM

## 2025-08-19 PROCEDURE — 97802 MEDICAL NUTRITION INDIV IN: CPT | Mod: 95

## 2025-08-27 ENCOUNTER — APPOINTMENT (OUTPATIENT)
Dept: CARDIOLOGY | Facility: CLINIC | Age: 40
End: 2025-08-27
Payer: COMMERCIAL

## 2025-08-27 PROCEDURE — 93015 CV STRESS TEST SUPVJ I&R: CPT

## 2025-08-27 PROCEDURE — 93308 TTE F-UP OR LMTD: CPT
